# Patient Record
Sex: MALE | Race: WHITE | NOT HISPANIC OR LATINO | Employment: FULL TIME | URBAN - METROPOLITAN AREA
[De-identification: names, ages, dates, MRNs, and addresses within clinical notes are randomized per-mention and may not be internally consistent; named-entity substitution may affect disease eponyms.]

---

## 2021-11-08 ENCOUNTER — TRANSFERRED RECORDS (OUTPATIENT)
Dept: HEALTH INFORMATION MANAGEMENT | Facility: CLINIC | Age: 51
End: 2021-11-08
Payer: COMMERCIAL

## 2021-11-23 ENCOUNTER — TRANSCRIBE ORDERS (OUTPATIENT)
Dept: OTHER | Age: 51
End: 2021-11-23
Payer: COMMERCIAL

## 2021-11-23 DIAGNOSIS — D09.9 SQUAMOUS CELL CARCINOMA IN SITU: Primary | ICD-10-CM

## 2021-11-24 ENCOUNTER — TELEPHONE (OUTPATIENT)
Dept: DERMATOLOGY | Facility: CLINIC | Age: 51
End: 2021-11-24
Payer: COMMERCIAL

## 2021-11-24 NOTE — TELEPHONE ENCOUNTER
I called and spoke with the patient, advised we do not have records yet but can schedule a telephone consult. I staff messaged Elyse tapia to request records.     Patient placed me on hold and then the call disconnected.     Susanne LOZANO CMA

## 2021-11-24 NOTE — TELEPHONE ENCOUNTER
[facility] Call Center    Phone Message    May a detailed message be left on voicemail: yes     Reason for Call: Other: Please review urgent Squamous cell carcinoma in situ referral from Amie García MD and schedule with Dr. Dandre CORONEL. Thanks!     Action Taken: Message routed to:  [facility] Dr. Amos- Dermatology    Travel Screening: Not Applicable

## 2021-11-29 NOTE — TELEPHONE ENCOUNTER
FUTURE VISIT INFORMATION      FUTURE VISIT INFORMATION:    Date: 12.1.21    Time: 2:00    Location: Telephone  REFERRAL INFORMATION:    Referring provider:  Dr. García    Referring providers clinic:  [facility]    Reason for visit/diagnosis  SCC- dont call earlier than 1:30    RECORDS REQUESTED FROM:       Clinic name Comments Records Status Photos Status   [facility] 11.8.21  Path # [id number] Epic Epic

## 2021-12-01 ENCOUNTER — PRE VISIT (OUTPATIENT)
Dept: DERMATOLOGY | Facility: CLINIC | Age: 51
End: 2021-12-01
Payer: COMMERCIAL

## 2021-12-01 ENCOUNTER — VIRTUAL VISIT (OUTPATIENT)
Dept: DERMATOLOGY | Facility: CLINIC | Age: 51
End: 2021-12-01
Payer: COMMERCIAL

## 2021-12-01 ENCOUNTER — MYC MEDICAL ADVICE (OUTPATIENT)
Dept: DERMATOLOGY | Facility: CLINIC | Age: 51
End: 2021-12-01

## 2021-12-01 DIAGNOSIS — D09.9 SQUAMOUS CELL CARCINOMA IN SITU (SCCIS): Primary | ICD-10-CM

## 2021-12-01 PROCEDURE — 99203 OFFICE O/P NEW LOW 30 MIN: CPT | Mod: 95 | Performed by: DERMATOLOGY

## 2021-12-01 ASSESSMENT — PAIN SCALES - GENERAL: PAINLEVEL: NO PAIN (0)

## 2021-12-01 NOTE — PROGRESS NOTES
Mohs Micrographic Surgery Consult Note    Dec 1, 2021  Start time (if telephone encounter): 2:00 p.m.  End time (if telephone encounter): 2:10 p.m.    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    Skin cancer(s): SCCis  Location(s): Dorsal penile shaft  Associated symptoms: pruritus, tenderness to touch  Onset: within last 1 year    No other associated symptoms, modifying factors, or prior treatments, except when noted above. The patient denies any constitutional symptoms, lymphadenopathy, unintentional weight loss or decreased appetite. No other skin concerns today.    Objective:   Skin: No photos sent in for exam.     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 Plan for Mohs micrographic surgery for skin cancer(s) above:  - We discussed the nature of the diagnosis/condition above. We discussed the treatment options, including the risks benefits and expectations of these options. We recommend micrographic surgery as the most effective and most tissue sparing option for treatment, and the patient agrees to proceed with this.  The patient is aware of the risks, benefits and expectations of this procedure. The patient will be scheduled for this procedure, if not already done so.  - We anticipate the following closure type: Linear closure, such as complex linear closure (CLC)    The patient was discussed with and evaluated by attending physician, Zachariah Amos MD.    Zay Simms MD  Micrographic Surgery and Dermatologic Oncology (MSDO) Fellow    Scribe Disclosure:  HAMMAD, Karissa Mart, am serving as a scribe to document services personally performed by Zachariah Amos MD based on data collection and the provider's statements to me.

## 2021-12-01 NOTE — NURSING NOTE
Dermatology Rooming Note    Simone Loco's goals for this visit include:   Chief Complaint   Patient presents with     Derm Problem     Jean-Claude has SCC on the penis. He is very anxious about this and would like the cancer removed as soon as possible      Tam Alejo CMA

## 2021-12-01 NOTE — LETTER
Date:December 10, 2021      Patient was self referred, no letter generated. Do not send.        [facility] Health Information

## 2021-12-01 NOTE — LETTER
12/1/2021       RE: Simone Loco  [address]  [place] [place] [address]     Dear Colleague,    Thank you for referring your patient, Simone Loco, to the [facility] DERMATOLOGIC SURGERY CLINIC [place] at [facility]. Please see a copy of my visit note below.    Mohs Micrographic Surgery Consult Note    Dec 1, 2021  Start time (if telephone encounter): 2:00 p.m.  End time (if telephone encounter): 2:10 p.m.    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    Skin cancer(s): SCCis  Location(s): Dorsal penile shaft  Associated symptoms: pruritus, tenderness to touch  Onset: within last 1 year    No other associated symptoms, modifying factors, or prior treatments, except when noted above. The patient denies any constitutional symptoms, lymphadenopathy, unintentional weight loss or decreased appetite. No other skin concerns today.    Objective:   Skin: No photos sent in for exam.     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 Plan for Mohs micrographic surgery for skin cancer(s) above:  - We discussed the nature of the diagnosis/condition above. We discussed the treatment options, including the risks benefits and expectations of these options. We recommend micrographic surgery as the most effective and most tissue sparing option for treatment, and the patient agrees to proceed with this.  The patient is aware of the risks, benefits and expectations of this procedure. The patient will be scheduled for this procedure, if not already done so.  - We anticipate the following closure type: Linear closure, such as complex linear closure (CLC)    The patient was discussed with and evaluated by attending physician, Zachariah Amos MD.    Zay Simms  MD  Micrographic Surgery and Dermatologic Oncology (MSDO) Fellow    Scribe Disclosure:  I, Karissa Mart, am serving as a scribe to document services personally performed by Zachariah Amos MD based on data collection and the provider's statements to me.         Attestation signed by Zachariah Amos MD at 12/8/2021  2:15 PM:    Attending Attestation  I attest that I discussed the case with the Fellow.  I agree with the plan.   I have reviewed the note and edited it as necessary.    Zachariah Amos M.D.  Professor  Director of Dermatologic Surgery  Department of Dermatology  [facility]         Again, thank you for allowing me to participate in the care of your patient.      Sincerely,    Zachariah Amos MD

## 2021-12-02 NOTE — TELEPHONE ENCOUNTER
Called patient to let him know I am currently waiting for the doctor to give me a date for the patient's surgery. I let the patient know we would be calling back as soon as we have that date.     Patient was satisfied and says that he will be awaiting our call.

## 2021-12-02 NOTE — TELEPHONE ENCOUNTER
Pt called back stating he is anxious and would like to get this scheduled. Writer informed him surgery staff is aware and they have reached out to Dr Amos. Informed him he would be receiving a call back to schedule. States understanding.

## 2021-12-02 NOTE — TELEPHONE ENCOUNTER
[facility] Call Center    Phone Message    May a detailed message be left on voicemail: yes     Reason for Call: Other: Pt needs to schedule MOHS     Action Taken: Message routed to:  [facility] KAUSHAL    Travel Screening: Not Applicable

## 2021-12-11 ENCOUNTER — HEALTH MAINTENANCE LETTER (OUTPATIENT)
Age: 51
End: 2021-12-11

## 2021-12-20 ENCOUNTER — OFFICE VISIT (OUTPATIENT)
Dept: DERMATOLOGY | Facility: CLINIC | Age: 51
End: 2021-12-20
Payer: COMMERCIAL

## 2021-12-20 VITALS — HEART RATE: 85 BPM | DIASTOLIC BLOOD PRESSURE: 112 MMHG | SYSTOLIC BLOOD PRESSURE: 195 MMHG

## 2021-12-20 DIAGNOSIS — A63.0 CONDYLOMA: ICD-10-CM

## 2021-12-20 DIAGNOSIS — C60.9 SQUAMOUS CELL CARCINOMA OF PENIS (H): Primary | ICD-10-CM

## 2021-12-20 PROCEDURE — 17311 MOHS 1 STAGE H/N/HF/G: CPT | Mod: GC | Performed by: DERMATOLOGY

## 2021-12-20 PROCEDURE — 12041 INTMD RPR N-HF/GENIT 2.5CM/<: CPT | Mod: GC | Performed by: DERMATOLOGY

## 2021-12-20 RX ORDER — IMIQUIMOD 12.5 MG/.25G
CREAM TOPICAL
Qty: 12 PACKET | Refills: 0 | Status: SHIPPED | OUTPATIENT
Start: 2021-12-20

## 2021-12-20 ASSESSMENT — PAIN SCALES - GENERAL: PAINLEVEL: NO PAIN (0)

## 2021-12-20 NOTE — PATIENT INSTRUCTIONS
Wound Care Instructions  I will experience scar, altered skin color, bleeding, swelling, pain, crusting and redness. I may experience altered sensation. Risks are excessive bleeding, infection, muscle weakness, thick (hypertrophic or keloidal) scar, and recurrence,. A second procedure may be recommended to obtain the best cosmetic or functional result.  Possible complications of any surgical procedure are bleeding, infection, scarring, alteration in skin color and sensation, muscle weakness in the area, wound dehiscence or seperation, or recurrence of the lesion or disease. On occasion, after healing, a secondary procedure or revision may be recommended in order to obtain the best cosmetic or functional result.   After your surgery, a pressure bandage will be placed over the area that has sutures. This will help prevent bleeding.   For the First 24 hours After Surgery:  1. Keep it dry and apply vaseline over the suture line.   2. Relax and take it easy. Do not do any vigorous exercise, heavy lifting, or bending forward. This could cause the wound to bleed.  3. Post-operative pain is usually mild. You may take plain or extra strength Tylenol every 6 hours as needed (do not take more than 4,000mg in one day). You can alternate with 600 mg of ibuprofen every 6 hours. Do not take any medicine that contains aspirin, ibuprofen or motrin unless you have been recommended these by a doctor. Avoid alcohol and vitamin E as these may increase your tendency to bleed.   4. You may put an ice pack around the bandaged area for 20 minutes every 2-3 hours. This may help reduce swelling, bruising, and pain.   5. You may see a small amount of drainage or blood on your pressure bandage. This is normal. However, if drainage or bleeding continues or saturates the bandage, you will need to apply firm pressure over the bandage with a washcloth for 15 minutes. If bleeding continues after applying pressure for 15 minutes then go to the  nearest emergency room.  24 Hours After Surgery  You may shower and get the wound wet. Wash wound with a mild soap and water.  Use caution when washing the wound. Be gentle and do not let the forceful shower stream hit the wound directly.  PAT dry.  Daily Wound Care:  1. Wash wound with a mild soap and water.  Use caution when washing the wound, be gentle and do not let the forceful shower stream hit the wound directly.  2. PAT DRY.  3. Apply Vaseline (from a new container or tube) over the suture line with a Q-tip. It is very important to keep the wound continuously moist, as wounds heal best in a moist environment.  4.  Keep the site covered until sutures are removed, you can cover it with a Telfa (non-stick) dressing and tape or a band-aid.    5. If you are unable to keep wound covered, you must apply Vaseline every 2 - 3 hours (while awake) to ensure it is being kept moist for optimal healing. A dressing overnight is recommended to keep the area moist.   Call Us If:  1. You have pain that is not controlled with Tylenol/Ibuprofen.  2. You have signs or symptoms of an infection, such as: fever over 100 degrees F, redness, warmth, or foul-smelling or yellow/creamy drainage from the wound.  Who should I call with questions?    [facility]: [phone]     [facility]: [phone]    For urgent needs outside of business hours call the [facility] at [phone] and ask for the dermatology resident on call

## 2021-12-20 NOTE — LETTER
Date:December 31, 2021      Patient was self referred, no letter generated. Do not send.        [facility] Information

## 2021-12-20 NOTE — Clinical Note
12/20/2021       RE: Simone Loco  [address]  [facility] [address]     Dear Colleague,    Thank you for referring your patient, Simone Loco, to the [facility] DERMATOLOGIC SURGERY CLINIC [place] at [facility]. Please see a copy of my visit note below.    [facility] Mohs Surgery Procedure Note    Case #: 1  Date of Service:  Dec 20, 2021  Surgery: Mohs micrographic surgery (MMS)  Staff surgeon: Zachariah Amos MD  Fellow surgeon: Zay Simms MD  Resident surgeon: None  Nurse: Alissa Davies CMA    Tumor Type: SCCis  Location: Dorsal penile shaft  Derm-Path Accession #: [id number]    Mohs Accession #:   Pre-Op Size: 1.3 cm x 0.9 cm  Final Defect Size: 1.5 cm x 2.0 cm  Number of Mohs stages: 1  Level of Defect: Fascia  Local anesthetic: 6 mL 1% lidocaine with epinephrine  Repair Type: Intermediate repair  Repair Size: 2.2 cm  Suture Material: 4-0 Monocryl; 5-0 fast absorbing gut    Procedure:    Stage I  We discussed the principles of treatment and most likely complications including scarring, bleeding, infection, swelling, pain, crusting, nerve damage, large wound,  incomplete excision, wound dehiscence,  nerve damage, recurrence, and a second procedure may be recommended to obtain the best cosmetic or functional result.    Informed consent was obtained and the patient underwent the procedure as follows:  The patient was placed supine on the operating table.  The cancer was identified, outlined with a marker, and verified by the patient.  The entire surgical field was prepped with chlorhexidine.  The surgical site was anesthetized using lidocaine with epinephrine.    The area of clinically apparent tumor was debulked. The layer of tissue was then surgically excised using a #15 blade and was then transferred onto a specimen sheet maintaining the orientation of the specimen. Hemostasis was obtained using  electrocoagulation. The wound site was then covered with a dressing while the tissue samples were processed for examination.    The excised tissue was transported to the Mohs histology laboratory maintaining the tissue orientation.  The tissue specimen was relaxed so that the entire surgical margin was in a single horizontal plane for sectioning and inked for precise mapping.  A precise reference map was drawn to reflect the sectioning of the specimen, colored inking of the margins, and orientation on the patient.  The tissue was processed using horizontal sectioning of the base and continuous peripheral margins.  The histopathologic sections were reviewed in conjunction with the reference map.    Total blocks: 1  Total slides: 2    There were no cancer cells visualized on examination, therefore Mohs surgery was complete.    REPAIR: An intermediate layered linear closure was selected as the procedure which would maximally preserve both function and cosmesis.    After the excision of the tumor, the area was carefully undermined. Hemostasis was obtained with electrocoagulation.  Closure was oriented so that the wound was parallel to the patient's relaxed skin tension lines.   The subcutaneous and dermal layers were then closed with 4-0 Monocryl sutures. The epidermis was then carefully approximated along the length of the wound using 5-0 fast absorbing gut simple running sutures.     Estimated blood loss was less than 10 ml for all surgical sites. A sterile pressure dressing was applied and wound care instructions, with a written handout, were given. The patient was discharged from the [facility] alert and ambulatory.    Follow-up for suture removal: Not applicable as only dissolving sutures used    Zachariah Amos MD was immediately available for the entire surgery and was physically present for the key portions of the procedure.      Dr. Zay Simms (Mohs micrographic surgery fellow) performed the Mohs  micrographic surgery and reconstruction under the direct supervision of Zachariah Amos MD, who was present for the entire micrographic surgery and key portions of the reconstruction, and always immediately available.    Scribe Disclosure:  I, Odilia Walker, am serving as a scribe to document services personally performed by Zachariah Amos MD based on data collection and the provider's statements to me.       Again, thank you for allowing me to participate in the care of your patient.      Sincerely,    Zachariah Amos MD

## 2021-12-20 NOTE — PROGRESS NOTES
[facility] Mohs Surgery Procedure Note    Case #: 1  Date of Service:  Dec 20, 2021  Surgery: Mohs micrographic surgery (MMS)  Staff surgeon: Zachariah Amos MD  Fellow surgeon: Zay Simms MD  Resident surgeon: None  Nurse: Alissa Davies CMA    Tumor Type: SCCis  Location: Dorsal penile shaft  Derm-Path Accession #: [id number]    Mohs Accession #:   Pre-Op Size: 1.3 cm x 0.9 cm  Final Defect Size: 1.5 cm x 2.0 cm  Number of Mohs stages: 1  Level of Defect: Fascia  Local anesthetic: 6 mL 1% lidocaine with epinephrine  Repair Type: Intermediate repair  Repair Size: 2.2 cm  Suture Material: 4-0 Monocryl; 5-0 fast absorbing gut    Procedure:    Stage I  We discussed the principles of treatment and most likely complications including scarring, bleeding, infection, swelling, pain, crusting, nerve damage, large wound,  incomplete excision, wound dehiscence,  nerve damage, recurrence, and a second procedure may be recommended to obtain the best cosmetic or functional result.    Informed consent was obtained and the patient underwent the procedure as follows:  The patient was placed supine on the operating table.  The cancer was identified, outlined with a marker, and verified by the patient.  The entire surgical field was prepped with chlorhexidine.  The surgical site was anesthetized using lidocaine with epinephrine.    The area of clinically apparent tumor was debulked. The layer of tissue was then surgically excised using a #15 blade and was then transferred onto a specimen sheet maintaining the orientation of the specimen. Hemostasis was obtained using electrocoagulation. The wound site was then covered with a dressing while the tissue samples were processed for examination.    The excised tissue was transported to the Mohs histology laboratory maintaining the tissue orientation.  The tissue specimen was relaxed so that the entire surgical margin was in a single horizontal plane for sectioning  and inked for precise mapping.  A precise reference map was drawn to reflect the sectioning of the specimen, colored inking of the margins, and orientation on the patient.  The tissue was processed using horizontal sectioning of the base and continuous peripheral margins.  The histopathologic sections were reviewed in conjunction with the reference map.    Total blocks: 1  Total slides: 2    There were no cancer cells visualized on examination, therefore Mohs surgery was complete.    REPAIR: An intermediate layered linear closure was selected as the procedure which would maximally preserve both function and cosmesis.    After the excision of the tumor, the area was carefully undermined. Hemostasis was obtained with electrocoagulation.  Closure was oriented so that the wound was parallel to the patient's relaxed skin tension lines.   The subcutaneous and dermal layers were then closed with 4-0 Monocryl sutures. The epidermis was then carefully approximated along the length of the wound using 5-0 fast absorbing gut simple running sutures.     Estimated blood loss was less than 10 ml for all surgical sites. A sterile pressure dressing was applied and wound care instructions, with a written handout, were given. The patient was discharged from the [facility] Center alert and ambulatory.    Follow-up for suture removal: Not applicable as only dissolving sutures used    Zachariah mAos MD was immediately available for the entire surgery and was physically present for the key portions of the procedure.      Dr. Zay Simms (Mohs micrographic surgery fellow) performed the Mohs micrographic surgery and reconstruction under the direct supervision of Zachariah Amos MD, who was present for the entire micrographic surgery and key portions of the reconstruction, and always immediately available.    Scribe Disclosure:  Odilia CUEVA, am serving as a scribe to document services personally performed by Zachariah Amos MD based on  data collection and the provider's statements to me.

## 2022-09-25 ENCOUNTER — HEALTH MAINTENANCE LETTER (OUTPATIENT)
Age: 52
End: 2022-09-25

## 2023-02-04 ENCOUNTER — HEALTH MAINTENANCE LETTER (OUTPATIENT)
Age: 53
End: 2023-02-04

## 2024-03-02 ENCOUNTER — HEALTH MAINTENANCE LETTER (OUTPATIENT)
Age: 54
End: 2024-03-02

## 2024-12-07 ENCOUNTER — HEALTH MAINTENANCE LETTER (OUTPATIENT)
Age: 54
End: 2024-12-07